# Patient Record
Sex: FEMALE | URBAN - METROPOLITAN AREA
[De-identification: names, ages, dates, MRNs, and addresses within clinical notes are randomized per-mention and may not be internally consistent; named-entity substitution may affect disease eponyms.]

---

## 2021-11-09 ENCOUNTER — APPOINTMENT (RX ONLY)
Dept: URBAN - METROPOLITAN AREA CLINIC 111 | Facility: CLINIC | Age: 66
Setting detail: DERMATOLOGY
End: 2021-11-09

## 2021-11-09 DIAGNOSIS — L29.89 OTHER PRURITUS: ICD-10-CM | Status: INADEQUATELY CONTROLLED

## 2021-11-09 DIAGNOSIS — D69.2 OTHER NONTHROMBOCYTOPENIC PURPURA: ICD-10-CM

## 2021-11-09 DIAGNOSIS — L29.8 OTHER PRURITUS: ICD-10-CM | Status: INADEQUATELY CONTROLLED

## 2021-11-09 PROCEDURE — ? PRESCRIPTION

## 2021-11-09 PROCEDURE — ? COUNSELING

## 2021-11-09 PROCEDURE — ? TREATMENT REGIMEN

## 2021-11-09 PROCEDURE — 99204 OFFICE O/P NEW MOD 45 MIN: CPT

## 2021-11-09 PROCEDURE — ? PRESCRIPTION MEDICATION MANAGEMENT

## 2021-11-09 PROCEDURE — ? DEFER

## 2021-11-09 PROCEDURE — ? DIAGNOSIS COMMENT

## 2021-11-09 RX ORDER — CLOBETASOL PROPIONATE 0.5 MG/G
CREAM TOPICAL
Qty: 45 | Refills: 2 | Status: ERX | COMMUNITY
Start: 2021-11-09

## 2021-11-09 RX ADMIN — CLOBETASOL PROPIONATE: 0.5 CREAM TOPICAL at 00:00

## 2021-11-09 ASSESSMENT — LOCATION SIMPLE DESCRIPTION DERM
LOCATION SIMPLE: RIGHT FOREARM
LOCATION SIMPLE: RIGHT UPPER ARM
LOCATION SIMPLE: ABDOMEN
LOCATION SIMPLE: LEFT UPPER ARM
LOCATION SIMPLE: LEFT FOREARM

## 2021-11-09 ASSESSMENT — LOCATION DETAILED DESCRIPTION DERM
LOCATION DETAILED: RIGHT VENTRAL PROXIMAL FOREARM
LOCATION DETAILED: LEFT ANTECUBITAL SKIN
LOCATION DETAILED: LEFT VENTRAL PROXIMAL FOREARM
LOCATION DETAILED: RIGHT ANTECUBITAL SKIN
LOCATION DETAILED: SUBXIPHOID

## 2021-11-09 ASSESSMENT — LOCATION ZONE DERM
LOCATION ZONE: TRUNK
LOCATION ZONE: ARM

## 2021-11-09 NOTE — PROCEDURE: PRESCRIPTION MEDICATION MANAGEMENT
Render In Strict Bullet Format?: Yes
Initiate Treatment: -\\n- Clobetasol cream: apply to the affected areas bid x 2 weeks on, 1 week off. Repeat prn for flares. Avoid face/groin/armpits.\\n- Moisturize area with Aveeno anti itch relief balm twice daily
Detail Level: Zone

## 2021-11-09 NOTE — PROCEDURE: TREATMENT REGIMEN
Detail Level: Zone
Plan: Recommended applying DerMend or arnica to affected areas twice daily, however, patient defers using treatment as she notes they are ineffective

## 2021-11-09 NOTE — HPI: RASH
How Severe Is Your Rash?: moderate
Is This A New Presentation, Or A Follow-Up?: Rash
Additional History: Patient has been applying ScarAway silicone scar gel and many other OTC topicals but ineffective.

## 2021-11-09 NOTE — PROCEDURE: DIAGNOSIS COMMENT
Detail Level: Simple
Render Risk Assessment In Note?: no
Comment: Patient could not hear but deferred .  We wrote down all questions/ instructions.  She has purpura.  She does not have psoriasis on the areas shown to me but she had tape and bandages covering much of her skin.  She looked underweight as well.  Discussed labwork but patient states she has already done labs.  Discussed that the purpura will likely continue but hopefully we can help with the itching.

## 2021-11-15 ENCOUNTER — APPOINTMENT (RX ONLY)
Dept: URBAN - METROPOLITAN AREA CLINIC 111 | Facility: CLINIC | Age: 66
Setting detail: DERMATOLOGY
End: 2021-11-15

## 2021-11-15 DIAGNOSIS — L259 CONTACT DERMATITIS AND OTHER ECZEMA, UNSPECIFIED CAUSE: ICD-10-CM

## 2021-11-15 PROBLEM — L23.9 ALLERGIC CONTACT DERMATITIS, UNSPECIFIED CAUSE: Status: ACTIVE | Noted: 2021-11-15

## 2021-11-15 PROCEDURE — 95044 PATCH/APPLICATION TESTS: CPT

## 2021-11-15 PROCEDURE — ? PATCH TESTING

## 2021-11-15 NOTE — PROCEDURE: PATCH TESTING
Detail Level: Zone
Consent: Written consent obtained, risks reviewed including but not limited to rash, itching, allergic reaction, systemic rash, remote possiblity of anaphylaxis to allergen.
Post-Care Instructions: PATIENT INSTRUCTIONS\\nPatch Testing\\n\\nPatch Testing Process\\n    • You are being patch tested to determine if your rash is caused by an allergic reaction to specific substances (allergens such as chemicals, minerals, or medications) that may have come into contact with your skin. \\n    • We have tested the most common allergens in [place]; since the test cannot be comprehensive, not everyone will get conclusive results. \\n    • The test process will involve at least two visits to the clinic after the test is applied, to look for various degrees of redness, swelling, or even blistering at the test sites.\\nWhat to Expect\\n    • We have applied strips of tape with small amounts of suspect substances onto your back. This may occasionally feel uncomfortable, and you may develop itching under one or more of the chambers. \\n    • Try to avoid scratching, as itching is normally an indication of a positive reaction and scratching might alter the test results. If pain occurs, call the clinic. \\n    • Keep the area dry until the patches are removed in 2-3 days. You may take a sponge bath, but DO NOT get your back wet. \\n    • Avoid heavy and physical exercises that could cause excessive perspiration and detachment of the test unit. \\n    • If you notice patches peeling or loosening from the skin, have someone apply pressure to the adhesive portion of the strips. If necessary, you can also apply additional tape to the edges of the chamber units. \\n    • Abstain from taking oral or injected steroid medications; these may invalidate the test. \\n    • Avoid prolonged sun exposure to the test area.\\nNext Steps\\n    • The chamber units will be removed after about 2 days for the initial reading to assess early reactions at the test sites and mariel them with ink. \\n    • A second reading will be scheduled in about 2 additional days to assess final test results. \\n    • Continue to watch the test area over the next week in the event there is a delayed reaction on the skin.\\n**NOTE:  Surgical marker used on patch test may transfer to clothing or sheets.\\nResults\\n    • Upon completion of this procedure, you will be provided with written results and advised of any skin allergies you may have. \\n    • We will provide you with the various names of the positive allergens and where to find them in your environment. \\n    • Isolating your skin allergens and avoiding exposure to them can help reduce the risk of future allergic reactions. \\n    • Return to the clinic if your skin condition is persistent or recurrent, and your doctor will provide treatment options, as needed.\\n\\nRevised 7/1/2015
Number Of Patches (Maximum Allowable Per Dos By Cms Is 90): 80

## 2021-11-18 ENCOUNTER — APPOINTMENT (RX ONLY)
Dept: URBAN - METROPOLITAN AREA CLINIC 111 | Facility: CLINIC | Age: 66
Setting detail: DERMATOLOGY
End: 2021-11-18

## 2021-11-18 DIAGNOSIS — L23.9 ALLERGIC CONTACT DERMATITIS, UNSPECIFIED CAUSE: ICD-10-CM

## 2021-11-18 PROCEDURE — ? PHOTO-DOCUMENTATION

## 2021-11-18 PROCEDURE — ? DIAGNOSIS COMMENT

## 2021-11-18 PROCEDURE — ? NORTH AMERICAN 80 PATCH TEST READING

## 2021-11-18 NOTE — PROCEDURE: DIAGNOSIS COMMENT
Detail Level: Simple
Comment: Patient no-showed for her patch removal and 1st patch test reading yesterday. Dr. Luu has approved that her patches are are removed today, a photo was taken and patient to RTC tomorrow for a patch testing reading with Chika Lundberg PA-C. \\n\\nPatient understands that no results will be provided at todays visit and the importance of returning to office tomorrow for an official patch test reading. Patient was instructed to keep her back dry until after her patch test reading tomorrow.
Render Risk Assessment In Note?: no

## 2021-11-18 NOTE — PROCEDURE: NORTH AMERICAN 80 PATCH TEST READING
What Reading Time Point?: 96 hour
Detail Level: Zone
Number Of Patches Read: 80
Show Negative Results In The Note?: Yes
Show Allergen Counseling In The Note?: No
Allergen 1 Reaction: no reaction

## 2021-11-19 ENCOUNTER — APPOINTMENT (RX ONLY)
Dept: URBAN - METROPOLITAN AREA CLINIC 111 | Facility: CLINIC | Age: 66
Setting detail: DERMATOLOGY
End: 2021-11-19

## 2021-11-19 DIAGNOSIS — D69.2 OTHER NONTHROMBOCYTOPENIC PURPURA: ICD-10-CM

## 2021-11-19 DIAGNOSIS — L29.8 OTHER PRURITUS: ICD-10-CM

## 2021-11-19 DIAGNOSIS — L29.89 OTHER PRURITUS: ICD-10-CM

## 2021-11-19 DIAGNOSIS — L23.9 ALLERGIC CONTACT DERMATITIS, UNSPECIFIED CAUSE: ICD-10-CM

## 2021-11-19 PROCEDURE — ? COUNSELING

## 2021-11-19 PROCEDURE — ? PRESCRIPTION MEDICATION MANAGEMENT

## 2021-11-19 PROCEDURE — 99213 OFFICE O/P EST LOW 20 MIN: CPT

## 2021-11-19 PROCEDURE — ? NORTH AMERICAN 80 PATCH TEST READING

## 2021-11-19 ASSESSMENT — LOCATION DETAILED DESCRIPTION DERM
LOCATION DETAILED: RIGHT DISTAL RADIAL DORSAL FOREARM
LOCATION DETAILED: LEFT PROXIMAL DORSAL FOREARM
LOCATION DETAILED: LEFT DISTAL DORSAL FOREARM
LOCATION DETAILED: RIGHT PROXIMAL DORSAL FOREARM

## 2021-11-19 ASSESSMENT — LOCATION SIMPLE DESCRIPTION DERM
LOCATION SIMPLE: RIGHT FOREARM
LOCATION SIMPLE: LEFT FOREARM

## 2021-11-19 ASSESSMENT — LOCATION ZONE DERM: LOCATION ZONE: ARM

## 2021-11-19 NOTE — PROCEDURE: PRESCRIPTION MEDICATION MANAGEMENT
Render In Strict Bullet Format?: No
Detail Level: Zone
Initiate Treatment: Advised patient to begin supplementing with Citrus Bioflavonoid Complex twice daily and moisturizing with CeraVe Anti-Itch Cream to affected, itchy areas of body twice daily.\\n\\nSamples of CeraVe Anti-itch cream were provided; however, patient will have to  Crump Bioflavonoid Complex supplement from drug store (no prescription version).
Initiate Treatment: Advised patient to begin moisturizing with CeraVe Anti-Itch Cream to affected, itchy areas of body twice daily (samples provided)
Initiate Treatment: Advised patient to begin supplementing with Citrus Bioflavonoid Complex twice daily. Patient will have to  Bandera Bioflavonoid Complex supplement from drug store (no prescription version).

## 2021-11-19 NOTE — PROCEDURE: NORTH AMERICAN 80 PATCH TEST READING
Name Of Allergen 54: Methylisothiazolinone
Name Of Allergen 19: Myroxylon pereirae resin/(Balsam [place])
Allergen 92 Reaction: no reaction
Name Of Allergen 74: Hydroperoxides of Linalool
Name Of Allergen 7: Amerchol L101
Name Of Allergen 30: 2-Bromo-2-nitropropane-l,3-diol (Brononol)
Name Of Allergen 42: Methyl methacrylate
Name Of Allergen 61: Desoximetasone
Name Of Allergen 14: Quaternium-15(Dowicil 200)/(1-(30Chloroallyl)-3,5,3-eglfrb-2-azoniaadamantane chloride)
Name Of Allergen 37: 2-tert-Butyl-4-methoxyphenol (BHA)
Name Of Allergen 69: Dibucaine hydrochloride
Name Of Allergen 49: Tea Tree Oil
Name Of Allergen 25: Disperse Orange 3
Name Of Allergen 2: 2-Mercaptobenzothiazole(MBT)
Allergen 20 Reaction: 3+
Name Of Allergen 56: DMDM Hydantoin
Name Of Allergen 76: Cocamidopropylbetaine
Name Of Allergen 44: Tixocortol-21-pivalate
Name Of Allergen 9: Neomycin sulfate
Name Of Allergen 21: Diazolidinylurea (Germall II)
Name Of Allergen 32: Thimerosal (Merthiolate)
Name Of Allergen 77: Formaldehyde
Name Of Allergen 57: Cananga odorata oil/(Ylang-Ylang oil)
Name Of Allergen 10: Thiuram mix
Name Of Allergen 65: Disperse Blue 106/124 Mix
Name Of Allergen 33: Propolis
Detail Level: Zone
Name Of Allergen 45: B-033A Budesonide
Name Of Allergen 64: 2-n-Octyl-4-isothiazolin-3-one
Name Of Allergen 72: Hydroxyisohexyl 3-Cyclohexene Carboxaldehyde (Lyral)
Name Of Allergen 17: N,N-Diphenylguanidine
What Reading Time Point?: 96 hour
Name Of Allergen 40: Glyceryl monothioglycolate (GMTG)
Name Of Allergen 52: Benzyl salicylate
Name Of Allergen 5: Imidazolidinyl urea Germall 115
Name Of Allergen 28: Fragrance mix
Number Of Patches Read: 80
Name Of Allergen 59: Isopropyl myristate
Name Of Allergen 79: Propylene glycol
Name Of Allergen 12: Ethylenediamine dihydrochloride
Name Of Allergen 67: Lidocaine
Name Of Allergen 35: Chloroxylenol (PCMX) / (4-Chloro-3,5-xylenol(PCMX)
Name Of Allergen 47: Triethanolamine
Name Of Allergen 23: Bacitracin
Name Of Allergen 80: Oleamidopropyl dimethylamine
Name Of Allergen 48: Textile dye mix
Show Allergen Counseling In The Note?: Yes
Name Of Allergen 60: Hydroperoxides of Limonene
Name Of Allergen 68: Fusidic acid sodium salt
Name Of Allergen 36: Ethyleneurea, melamine formaldehyde mix
Name Of Allergen 13: Epoxy resin Bisphenol A
Name Of Allergen 24: Mixed dialkyl thiourea
Name Of Allergen 1: Benzocaine
Name Of Allergen 43: Cobalt (II) chloride hexahydrate
Name Of Allergen 20: Nickelsulfate hexahydrate
Name Of Allergen 55: Hema (2-Hydroxyethyl methacrylate)
Name Of Allergen 75: Amidoamine
Name Of Allergen 31: Sesquiterpene lactone mix
Name Of Allergen 8: carba mix
Name Of Allergen 62: Polysorbate 80 / (Polyoxyethylenesorbitanmonooleate(Tween 80)
Name Of Allergen 38: Gold(I)sodium thiosulfate dihydrate
Name Of Allergen 50: Fragrance Mix II
Name Of Allergen 70: Benzoyl peroxide
Name Of Allergen 15: 5-vsbw-Tptvamgxxdmrdvsigpnlaaq resin
Allergen 1 Counseling: Patch test safe list emailed to patient with positive reactions.
Name Of Allergen 3: Colophonium/(Colophony)
Name Of Allergen 26: Paraben mix
Name Of Allergen 51: Disperse Yellow 3
Name Of Allergen 63: Iodopropynyl butyl carbamate
Allergen 2 Counseling: Allergen information sheets including equivocals provided to patient.
Name Of Allergen 4: p-Phenylenediamine(PPD)
Name Of Allergen 27: Methyldibromo glutaronitrile (MDBGN)
Name Of Allergen 71: Isoamyl-p-methoxycinnamate
Name Of Allergen 39: Ethyl acrylate
Name Of Allergen 16: Mercapto mix
Name Of Allergen 58: Benzyl alcohol
Name Of Allergen 66: Compositae Mix II
Name Of Allergen 78: Methylisothiazolinone + Methylchloroisothiazolinone/(Cl+-Me-isothiazolinone(Lion )
Name Of Allergen 46: Cocamide ANNELIESE/(Coconut diethanolamide)
Name Of Allergen 34: Benzophenone-3/ (2-Hydroxy-4-methoxybenzophenone)
Name Of Allergen 11: Clobetasol-17-propionate
Name Of Allergen 22: Tocopherol/ (DL alpha tocopherol)
Name Of Allergen 41: Toluenesulfonamide formaldehyde resin
Name Of Allergen 6: Cinnamal/(Cinnamic aldehyde)
Name Of Allergen 73: Ethylhexyl Salicylate
Name Of Allergen 53: Decyl Glucoside
Name Of Allergen 18: Potassium dichromate
Show Negative Results In The Note?: No
Name Of Allergen 29: Glutaral / (Glutaraldehyde)

## 2022-04-19 ENCOUNTER — APPOINTMENT (RX ONLY)
Dept: URBAN - METROPOLITAN AREA CLINIC 111 | Facility: CLINIC | Age: 67
Setting detail: DERMATOLOGY
End: 2022-04-19

## 2022-04-19 DIAGNOSIS — L90.5 SCAR CONDITIONS AND FIBROSIS OF SKIN: ICD-10-CM

## 2022-04-19 DIAGNOSIS — L29.89 OTHER PRURITUS: ICD-10-CM | Status: INADEQUATELY CONTROLLED

## 2022-04-19 DIAGNOSIS — L90.8 OTHER ATROPHIC DISORDERS OF SKIN: ICD-10-CM

## 2022-04-19 DIAGNOSIS — L29.8 OTHER PRURITUS: ICD-10-CM | Status: INADEQUATELY CONTROLLED

## 2022-04-19 DIAGNOSIS — L20.89 OTHER ATOPIC DERMATITIS: ICD-10-CM

## 2022-04-19 PROBLEM — L20.84 INTRINSIC (ALLERGIC) ECZEMA: Status: ACTIVE | Noted: 2022-04-19

## 2022-04-19 PROCEDURE — ? PRESCRIPTION MEDICATION MANAGEMENT

## 2022-04-19 PROCEDURE — ? PRESCRIPTION

## 2022-04-19 PROCEDURE — ? COUNSELING

## 2022-04-19 PROCEDURE — 99214 OFFICE O/P EST MOD 30 MIN: CPT

## 2022-04-19 PROCEDURE — ? DIAGNOSIS COMMENT

## 2022-04-19 RX ORDER — HYDROXYZINE HYDROCHLORIDE 25 MG/1
TABLET, FILM COATED ORAL
Qty: 60 | Refills: 0 | Status: ERX | COMMUNITY
Start: 2022-04-19

## 2022-04-19 RX ADMIN — HYDROXYZINE HYDROCHLORIDE: 25 TABLET, FILM COATED ORAL at 00:00

## 2022-04-19 ASSESSMENT — LOCATION SIMPLE DESCRIPTION DERM
LOCATION SIMPLE: RIGHT POSTERIOR UPPER ARM
LOCATION SIMPLE: RIGHT FOREARM
LOCATION SIMPLE: LEFT FOREARM

## 2022-04-19 ASSESSMENT — LOCATION ZONE DERM: LOCATION ZONE: ARM

## 2022-04-19 ASSESSMENT — LOCATION DETAILED DESCRIPTION DERM
LOCATION DETAILED: RIGHT PROXIMAL DORSAL FOREARM
LOCATION DETAILED: RIGHT PROXIMAL LATERAL POSTERIOR UPPER ARM
LOCATION DETAILED: LEFT PROXIMAL DORSAL FOREARM
LOCATION DETAILED: RIGHT DISTAL POSTERIOR UPPER ARM

## 2022-04-19 NOTE — PROCEDURE: PRESCRIPTION MEDICATION MANAGEMENT
Render In Strict Bullet Format?: Yes
Detail Level: Zone
Plan: -\\n\\nAdvised patient to moisturize with CeraVe moisturizing Cream within 2 minutes out of the shower.Patient does not believes that using over the counter creams and lotion will help her. Patient believes that topical steroids will not be as effective as her neem oil, she is a strong believer that the “virus” she had is what caused her to become itchy and have red splotches.\\n\\nAdvised patient to see a dermatologist at [facility].
Continue Regimen: -\\n\\nMoisturizing with neem oil. \\n-
Continue Regimen: -\\n\\nhydroxyzine HCl 25 mg tablet Take 1-2 tablets every night 1 hour before bed as needed for itching. MAY CAUSE DROWSINESS.\\n-

## 2022-04-19 NOTE — PROCEDURE: DIAGNOSIS COMMENT
Detail Level: Simple
Render Risk Assessment In Note?: no
Comment: Discussed Sublative laser. Patient defers for now , states “ this is a rip off”
Comment: Patient came in today asking about itching and her skin texture.  She wants to know why she had muscle weakness and skin involvement coming out of the hospital.  She states she is \"going to [place]\" the hospital for \"trying to kill her.\"  She mentioned a \"virus\" causing the skin issues and muscle weakness but when I later asked about the \"virus\" she states she never said she had a virus.  \\n\\nPatient has a patch of eczema on her arm but otherwise, normal appearing skin.  She is asking what can help scaring from scratching and crepiness.  I explained that due to weight loss, dryness and aging, the changes appear to be within normal limits.  Emphasized moisturizing and offered a topical steroid or tacrolimus but I could barely speak b/c patient was speaking over me about how she has tried every rx and every moisturizer and only her oil helps.  I asked her what she wanted from me today if nothing was helpful and she did not have a clear answer.  I recommended she seek another opinion since she did not seem to be agreeable to my recommendations.

## 2023-04-03 ENCOUNTER — APPOINTMENT (RX ONLY)
Dept: URBAN - METROPOLITAN AREA CLINIC 111 | Facility: CLINIC | Age: 68
Setting detail: DERMATOLOGY
End: 2023-04-03

## 2023-04-03 DIAGNOSIS — L29.89 OTHER PRURITUS: ICD-10-CM | Status: INADEQUATELY CONTROLLED

## 2023-04-03 DIAGNOSIS — L29.8 OTHER PRURITUS: ICD-10-CM | Status: INADEQUATELY CONTROLLED

## 2023-04-03 PROCEDURE — ? COUNSELING

## 2023-04-03 PROCEDURE — ? PRESCRIPTION MEDICATION MANAGEMENT

## 2023-04-03 PROCEDURE — ? DIAGNOSIS COMMENT

## 2023-04-03 PROCEDURE — ? PRESCRIPTION

## 2023-04-03 PROCEDURE — 99214 OFFICE O/P EST MOD 30 MIN: CPT

## 2023-04-03 RX ORDER — HYDROXYZINE HYDROCHLORIDE 25 MG/1
TABLET, FILM COATED ORAL
Qty: 60 | Refills: 0 | Status: ERX

## 2023-04-03 RX ORDER — FLUOCINOLONE ACETONIDE 0.11 MG/ML
OIL TOPICAL
Qty: 118.28 | Refills: 2 | Status: ERX | COMMUNITY
Start: 2023-04-03

## 2023-04-03 RX ADMIN — FLUOCINOLONE ACETONIDE: 0.11 OIL TOPICAL at 00:00

## 2023-04-03 NOTE — PROCEDURE: DIAGNOSIS COMMENT
Render Risk Assessment In Note?: no
Comment: Patient has a patch of eczema on her arm but otherwise, normal appearing skin. She reports itching throughout the body that comes and goes, and is unbearable. She states nothing has given her relief in the past. We discussed topical steroids, hydroxyzine, Doxepin and low dose naltrexone. Patient to consult with her pharmacist concerning the medications and will contact our office if she elects to try the naltrexone. She notes that hydroxyzine does nothing and she would never take Doxepin due to potential SE, even though I discussed that it would be low dose.  Derma-soothe oil and hydroxyzine sent today. Also discussed light therapy to control the pruritus. Will obtain insurance benefits and patient to research on her own. \\n\\nPatient reports having been evaluated by the [facility] and states she has an upcoming appointment to do “a full body test for all cancers”. Advised patient to follow up with the [facility] as she was satisfied with her experience there. Also encouraged patient make sure she is up to date with her current lab work with her PCP to help determine an underlying cause of pruritus and all her cancer screenings are up to date. Overall patient was unreceptive to my treatment options.  When I walked in the room, she noted that she had a list of things she has tried, nothing has worked, she is suing another clinic and asked that I call the pharmacist to get his/ her opinion.   I stated that I was a skin specialist and I would give her my current recommendations but that if everything I recommend does not help, she may need to seek care elsewhere for another opinion.
Detail Level: Simple

## 2023-04-03 NOTE — PROCEDURE: PRESCRIPTION MEDICATION MANAGEMENT
Initiate Treatment: -\\n- Derma soothe oil: Apply to affected areas twice daily for 2 weeks on, then 1 week off. Repeat cycle prn flares. Avoid face/groin/axillae.
Render In Strict Bullet Format?: Yes
Detail Level: Zone
Plan: -\\n- Disucssed low dose Naltrexone as a relief for nighttime itching. Patient to discuss with pharmacist and call office if elects to try prescription.
Continue Regimen: - \\n- hydroxyzine HCl 25 mg tablet Take 1-2 tablets every night 1 hour before bed as needed for itching. MAY CAUSE DROWSINESS.

## 2023-04-18 ENCOUNTER — RX ONLY (OUTPATIENT)
Age: 68
Setting detail: RX ONLY
End: 2023-04-18

## 2023-04-18 RX ORDER — HYDROXYZINE HYDROCHLORIDE 25 MG/1
TABLET, FILM COATED ORAL
Qty: 180 | Refills: 3 | Status: ERX

## 2023-06-23 ENCOUNTER — RX ONLY (OUTPATIENT)
Age: 68
Setting detail: RX ONLY
End: 2023-06-23

## 2023-06-23 RX ORDER — HYDROXYZINE HYDROCHLORIDE 25 MG/1
TABLET, FILM COATED ORAL
Qty: 180 | Refills: 0 | Status: ERX

## 2023-08-07 ENCOUNTER — APPOINTMENT (RX ONLY)
Dept: URBAN - METROPOLITAN AREA CLINIC 111 | Facility: CLINIC | Age: 68
Setting detail: DERMATOLOGY
End: 2023-08-07

## 2023-08-07 DIAGNOSIS — L29.8 OTHER PRURITUS: ICD-10-CM | Status: INADEQUATELY CONTROLLED

## 2023-08-07 DIAGNOSIS — D69.2 OTHER NONTHROMBOCYTOPENIC PURPURA: ICD-10-CM

## 2023-08-07 DIAGNOSIS — L90.5 SCAR CONDITIONS AND FIBROSIS OF SKIN: ICD-10-CM

## 2023-08-07 DIAGNOSIS — L90.8 OTHER ATROPHIC DISORDERS OF SKIN: ICD-10-CM

## 2023-08-07 DIAGNOSIS — L82.0 INFLAMED SEBORRHEIC KERATOSIS: ICD-10-CM

## 2023-08-07 DIAGNOSIS — L29.89 OTHER PRURITUS: ICD-10-CM | Status: INADEQUATELY CONTROLLED

## 2023-08-07 PROCEDURE — ? LIQUID NITROGEN

## 2023-08-07 PROCEDURE — 17110 DESTRUCTION B9 LES UP TO 14: CPT

## 2023-08-07 PROCEDURE — 99212 OFFICE O/P EST SF 10 MIN: CPT | Mod: 25

## 2023-08-07 PROCEDURE — ? DIAGNOSIS COMMENT

## 2023-08-07 PROCEDURE — ? COUNSELING

## 2023-08-07 ASSESSMENT — LOCATION DETAILED DESCRIPTION DERM
LOCATION DETAILED: LEFT PROXIMAL POSTERIOR UPPER ARM
LOCATION DETAILED: RIGHT DISTAL POSTERIOR UPPER ARM
LOCATION DETAILED: LEFT LATERAL SUPERIOR EYELID
LOCATION DETAILED: RIGHT PROXIMAL DORSAL FOREARM
LOCATION DETAILED: RIGHT LATERAL EYEBROW
LOCATION DETAILED: LEFT DISTAL DORSAL FOREARM
LOCATION DETAILED: LEFT PROXIMAL DORSAL FOREARM
LOCATION DETAILED: RIGHT DISTAL DORSAL FOREARM
LOCATION DETAILED: LEFT INFERIOR CENTRAL MALAR CHEEK

## 2023-08-07 ASSESSMENT — LOCATION SIMPLE DESCRIPTION DERM
LOCATION SIMPLE: LEFT FOREARM
LOCATION SIMPLE: RIGHT FOREARM
LOCATION SIMPLE: RIGHT POSTERIOR UPPER ARM
LOCATION SIMPLE: LEFT POSTERIOR UPPER ARM
LOCATION SIMPLE: LEFT SUPERIOR EYELID
LOCATION SIMPLE: LEFT CHEEK
LOCATION SIMPLE: RIGHT EYEBROW

## 2023-08-07 ASSESSMENT — LOCATION ZONE DERM
LOCATION ZONE: ARM
LOCATION ZONE: EYELID
LOCATION ZONE: FACE

## 2023-08-07 NOTE — HPI: SKIN LESION
Is This A New Presentation, Or A Follow-Up?: Skin Lesions
What Type Of Note Output Would You Prefer (Optional)?: Bullet Format
How Severe Is Your Skin Lesion?: moderate
Has Your Skin Lesion Been Treated?: not been treated
Additional History: Pt states after being in the [facility] she started getting lesions on both of her arms.

## 2023-08-07 NOTE — PROCEDURE: DIAGNOSIS COMMENT
Render Risk Assessment In Note?: no
Detail Level: Simple
Comment: Pt expressed interest in Upneeq eye drops, providing Liz with patients name to be added to contact list.
Comment: Pt presented with discoloration on arms which appeared as scar tissue from fragile skin.  Patient states she is \"suing a rehab facility\" for the scars on her arms.
Comment: Recommended Dermend cream to apply a thin layer to skin everyday
Comment: Much improved itching/ eczema per patient.  She notes a bit on her scalp after a new shampoo but improved upon changing shampoos.

## 2023-11-07 ENCOUNTER — APPOINTMENT (RX ONLY)
Dept: URBAN - METROPOLITAN AREA CLINIC 111 | Facility: CLINIC | Age: 68
Setting detail: DERMATOLOGY
End: 2023-11-07

## 2023-11-07 DIAGNOSIS — L72.0 EPIDERMAL CYST: ICD-10-CM

## 2023-11-07 DIAGNOSIS — L70.0 ACNE VULGARIS: ICD-10-CM

## 2023-11-07 PROCEDURE — ? PRESCRIPTION

## 2023-11-07 PROCEDURE — 99213 OFFICE O/P EST LOW 20 MIN: CPT

## 2023-11-07 PROCEDURE — ? PRESCRIPTION MEDICATION MANAGEMENT

## 2023-11-07 PROCEDURE — ? DIAGNOSIS COMMENT

## 2023-11-07 PROCEDURE — ? COUNSELING

## 2023-11-07 RX ORDER — TRETIONIN 0.25 MG/G
CREAM TOPICAL QHS
Qty: 45 | Refills: 3 | Status: ERX | COMMUNITY
Start: 2023-11-07

## 2023-11-07 RX ADMIN — TRETIONIN: 0.25 CREAM TOPICAL at 00:00

## 2023-11-07 ASSESSMENT — LOCATION SIMPLE DESCRIPTION DERM
LOCATION SIMPLE: LEFT CHEEK
LOCATION SIMPLE: LEFT CHEEK
LOCATION SIMPLE: RIGHT CHEEK

## 2023-11-07 ASSESSMENT — LOCATION DETAILED DESCRIPTION DERM
LOCATION DETAILED: RIGHT MEDIAL MALAR CHEEK
LOCATION DETAILED: LEFT CENTRAL MALAR CHEEK
LOCATION DETAILED: LEFT CENTRAL MALAR CHEEK

## 2023-11-07 ASSESSMENT — LOCATION ZONE DERM
LOCATION ZONE: FACE
LOCATION ZONE: FACE

## 2023-11-07 NOTE — PROCEDURE: PRESCRIPTION MEDICATION MANAGEMENT
Render In Strict Bullet Format?: Yes
Initiate Treatment: -\\n- Tretinoin 0.025%: Apply a pea sized amount at bedtime. Can start every 3rd night, work up to nightly as tolerated.
Detail Level: Zone

## 2023-11-07 NOTE — HPI: BODY LOCATION - FACE
How Severe Is Your Condition?: moderate
Additional History: Patient would like to know what can be done to “tighten jawline”

## 2023-11-07 NOTE — PROCEDURE: COUNSELING
Include Pregnancy/Lactation Warning?: No
Minocycline Pregnancy And Lactation Text: This medication is Pregnancy Category D and not consider safe during pregnancy. It is also excreted in breast milk.
Bactrim Pregnancy And Lactation Text: This medication is Pregnancy Category D and is known to cause fetal risk.  It is also excreted in breast milk.
Erythromycin Counseling:  I discussed with the patient the risks of erythromycin including but not limited to GI upset, allergic reaction, drug rash, diarrhea, increase in liver enzymes, and yeast infections.
Birth Control Pills Pregnancy And Lactation Text: This medication should be avoided if pregnant and for the first 30 days post-partum.
Topical Clindamycin Pregnancy And Lactation Text: This medication is Pregnancy Category B and is considered safe during pregnancy. It is unknown if it is excreted in breast milk.
Isotretinoin Counseling: Patient should get monthly blood tests, not donate blood, not drive at night if vision affected, not share medication, and not undergo elective surgery for 6 months after tx completed. Side effects reviewed, pt to contact office should one occur.
Detail Level: Simple
Tazorac Pregnancy And Lactation Text: This medication is not safe during pregnancy. It is unknown if this medication is excreted in breast milk.
Azelaic Acid Counseling: Patient counseled that medicine may cause skin irritation and to avoid applying near the eyes.  In the event of skin irritation, the patient was advised to reduce the amount of the drug applied or use it less frequently.   The patient verbalized understanding of the proper use and possible adverse effects of azelaic acid.  All of the patient's questions and concerns were addressed.
Isotretinoin Pregnancy And Lactation Text: This medication is Pregnancy Category X and is considered extremely dangerous during pregnancy. It is unknown if it is excreted in breast milk.
Topical Sulfur Applications Counseling: Topical Sulfur Counseling: Patient counseled that this medication may cause skin irritation or allergic reactions.  In the event of skin irritation, the patient was advised to reduce the amount of the drug applied or use it less frequently.   The patient verbalized understanding of the proper use and possible adverse effects of topical sulfur application.  All of the patient's questions and concerns were addressed.
Dapsone Counseling: I discussed with the patient the risks of dapsone including but not limited to hemolytic anemia, agranulocytosis, rashes, methemoglobinemia, kidney failure, peripheral neuropathy, headaches, GI upset, and liver toxicity.  Patients who start dapsone require monitoring including baseline LFTs and weekly CBCs for the first month, then every month thereafter.  The patient verbalized understanding of the proper use and possible adverse effects of dapsone.  All of the patient's questions and concerns were addressed.
Aklief counseling:  Patient advised to apply a pea-sized amount only at bedtime and wait 30 minutes after washing their face before applying.  If too drying, patient may add a non-comedogenic moisturizer.  The most commonly reported side effects including irritation, redness, scaling, dryness, stinging, burning, itching, and increased risk of sunburn.  The patient verbalized understanding of the proper use and possible adverse effects of retinoids.  All of the patient's questions and concerns were addressed.
Azithromycin Pregnancy And Lactation Text: This medication is considered safe during pregnancy and is also secreted in breast milk.
Doxycycline Counseling:  Patient counseled regarding possible photosensitivity and increased risk for sunburn.  Patient instructed to avoid sunlight, if possible.  When exposed to sunlight, patients should wear protective clothing, sunglasses, and sunscreen.  The patient was instructed to call the office immediately if the following severe adverse effects occur:  hearing changes, easy bruising/bleeding, severe headache, or vision changes.  The patient verbalized understanding of the proper use and possible adverse effects of doxycycline.  All of the patient's questions and concerns were addressed.
Winlevi Counseling:  I discussed with the patient the risks of topical clascoterone including but not limited to erythema, scaling, itching, and stinging. Patient voiced their understanding.
Topical Retinoid Pregnancy And Lactation Text: This medication is Pregnancy Category C. It is unknown if this medication is excreted in breast milk.
Tetracycline Counseling: Patient counseled regarding possible photosensitivity and increased risk for sunburn.  Patient instructed to avoid sunlight, if possible.  When exposed to sunlight, patients should wear protective clothing, sunglasses, and sunscreen.  The patient was instructed to call the office immediately if the following severe adverse effects occur:  hearing changes, easy bruising/bleeding, severe headache, or vision changes.  The patient verbalized understanding of the proper use and possible adverse effects of tetracycline.  All of the patient's questions and concerns were addressed. Patient understands to avoid pregnancy while on therapy due to potential birth defects.
Benzoyl Peroxide Pregnancy And Lactation Text: This medication is Pregnancy Category C. It is unknown if benzoyl peroxide is excreted in breast milk.
High Dose Vitamin A Pregnancy And Lactation Text: High dose vitamin A therapy is contraindicated during pregnancy and breast feeding.
Tazorac Counseling:  Patient advised that medication is irritating and drying.  Patient may need to apply sparingly and wash off after an hour before eventually leaving it on overnight.  The patient verbalized understanding of the proper use and possible adverse effects of tazorac.  All of the patient's questions and concerns were addressed.
Bactrim Counseling:  I discussed with the patient the risks of sulfa antibiotics including but not limited to GI upset, allergic reaction, drug rash, diarrhea, dizziness, photosensitivity, and yeast infections.  Rarely, more serious reactions can occur including but not limited to aplastic anemia, agranulocytosis, methemoglobinemia, blood dyscrasias, liver or kidney failure, lung infiltrates or desquamative/blistering drug rashes.
Aklief Pregnancy And Lactation Text: It is unknown if this medication is safe to use during pregnancy.  It is unknown if this medication is excreted in breast milk.  Breastfeeding women should use the topical cream on the smallest area of the skin for the shortest time needed while breastfeeding.  Do not apply to nipple and areola.
Winlevi Pregnancy And Lactation Text: This medication is considered safe during pregnancy and breastfeeding.
Topical Clindamycin Counseling: Patient counseled that this medication may cause skin irritation or allergic reactions.  In the event of skin irritation, the patient was advised to reduce the amount of the drug applied or use it less frequently.   The patient verbalized understanding of the proper use and possible adverse effects of clindamycin.  All of the patient's questions and concerns were addressed.
Azelaic Acid Pregnancy And Lactation Text: This medication is considered safe during pregnancy and breast feeding.
Spironolactone Counseling: Patient advised regarding risks of diarrhea, abdominal pain, hyperkalemia, birth defects (for female patients), liver toxicity and renal toxicity. The patient may need blood work to monitor liver and kidney function and potassium levels while on therapy. The patient verbalized understanding of the proper use and possible adverse effects of spironolactone.  All of the patient's questions and concerns were addressed.
Sarecycline Counseling: Patient advised regarding possible photosensitivity and discoloration of the teeth, skin, lips, tongue and gums.  Patient instructed to avoid sunlight, if possible.  When exposed to sunlight, patients should wear protective clothing, sunglasses, and sunscreen.  The patient was instructed to call the office immediately if the following severe adverse effects occur:  hearing changes, easy bruising/bleeding, severe headache, or vision changes.  The patient verbalized understanding of the proper use and possible adverse effects of sarecycline.  All of the patient's questions and concerns were addressed.
Erythromycin Pregnancy And Lactation Text: This medication is Pregnancy Category B and is considered safe during pregnancy. It is also excreted in breast milk.
Birth Control Pills Counseling: Birth Control Pill Counseling: I discussed with the patient the potential side effects of OCPs including but not limited to increased risk of stroke, heart attack, thrombophlebitis, deep venous thrombosis, hepatic adenomas, breast changes, GI upset, headaches, and depression.  The patient verbalized understanding of the proper use and possible adverse effects of OCPs. All of the patient's questions and concerns were addressed.
Spironolactone Pregnancy And Lactation Text: This medication can cause feminization of the male fetus and should be avoided during pregnancy. The active metabolite is also found in breast milk.
Benzoyl Peroxide Counseling: Patient counseled that medicine may cause skin irritation and bleach clothing.  In the event of skin irritation, the patient was advised to reduce the amount of the drug applied or use it less frequently.   The patient verbalized understanding of the proper use and possible adverse effects of benzoyl peroxide.  All of the patient's questions and concerns were addressed.
High Dose Vitamin A Counseling: Side effects reviewed, pt to contact office should one occur.
Doxycycline Pregnancy And Lactation Text: This medication is Pregnancy Category D and not consider safe during pregnancy. It is also excreted in breast milk but is considered safe for shorter treatment courses.
Minocycline Counseling: Patient advised regarding possible photosensitivity and discoloration of the teeth, skin, lips, tongue and gums.  Patient instructed to avoid sunlight, if possible.  When exposed to sunlight, patients should wear protective clothing, sunglasses, and sunscreen.  The patient was instructed to call the office immediately if the following severe adverse effects occur:  hearing changes, easy bruising/bleeding, severe headache, or vision changes.  The patient verbalized understanding of the proper use and possible adverse effects of minocycline.  All of the patient's questions and concerns were addressed.
Azithromycin Counseling:  I discussed with the patient the risks of azithromycin including but not limited to GI upset, allergic reaction, drug rash, diarrhea, and yeast infections.
Dapsone Pregnancy And Lactation Text: This medication is Pregnancy Category C and is not considered safe during pregnancy or breast feeding.
Topical Retinoid counseling:  Patient advised to apply a pea-sized amount only at bedtime and wait 30 minutes after washing their face before applying.  If too drying, patient may add a non-comedogenic moisturizer. The patient verbalized understanding of the proper use and possible adverse effects of retinoids.  All of the patient's questions and concerns were addressed.
Topical Sulfur Applications Pregnancy And Lactation Text: This medication is Pregnancy Category C and has an unknown safety profile during pregnancy. It is unknown if this topical medication is excreted in breast milk.
Detail Level: Detailed

## 2023-11-07 NOTE — HPI: PIMPLES (ACNE)
How Severe Is Your Acne?: moderate
Is This A New Presentation, Or A Follow-Up?: Acne
What Type Of Note Output Would You Prefer (Optional)?: Bullet Format
Additional Comments (Use Complete Sentences): Patient would like black heads to be treated.\\n

## 2023-11-07 NOTE — PROCEDURE: DIAGNOSIS COMMENT
No
Comment: offered cosmetic extractions, patient deferred.
Render Risk Assessment In Note?: no
Detail Level: Simple

## 2024-10-15 ENCOUNTER — RX ONLY (OUTPATIENT)
Age: 69
Setting detail: RX ONLY
End: 2024-10-15

## 2024-10-15 RX ORDER — FLUOCINOLONE ACETONIDE 0.11 MG/ML
OIL TOPICAL
Qty: 118.28 | Refills: 0 | Status: ERX

## 2025-01-21 NOTE — PROCEDURE: LIQUID NITROGEN
Show Applicator Variable?: Yes
Spray Paint Text: The liquid nitrogen was applied to the skin utilizing a spray paint frosting technique.
Add 52 Modifier (Optional): no
Medical Necessity Information: It is in your best interest to select a reason for this procedure from the list below. All of these items fulfill various CMS LCD requirements except the new and changing color options.
Detail Level: Detailed
Consent: The patient's consent was obtained including but not limited to risks of crusting, scabbing, blistering, scarring, darker or lighter pigmentary change, recurrence, incomplete removal and infection.
Post-Care Instructions: I reviewed with the patient in detail post-care instructions. Patient is to wear sunprotection, and avoid picking at any of the treated lesions. Pt may apply Vaseline to crusted or scabbing areas.
Medical Necessity Clause: This procedure was medically necessary because the lesions that were treated were:
Detail Level: Detailed
Quality 226: Preventive Care And Screening: Tobacco Use: Screening And Cessation Intervention: Patient screened for tobacco use, is a smoker AND received Cessation Counseling within measurement period or in the six months prior to the measurement period